# Patient Record
Sex: FEMALE | Race: WHITE | NOT HISPANIC OR LATINO | Employment: UNEMPLOYED | URBAN - METROPOLITAN AREA
[De-identification: names, ages, dates, MRNs, and addresses within clinical notes are randomized per-mention and may not be internally consistent; named-entity substitution may affect disease eponyms.]

---

## 2019-01-04 ENCOUNTER — TELEPHONE (OUTPATIENT)
Dept: GASTROENTEROLOGY | Facility: CLINIC | Age: 59
End: 2019-01-04

## 2019-01-11 RX ORDER — RABEPRAZOLE SODIUM 20 MG/1
20 TABLET, DELAYED RELEASE ORAL DAILY
Refills: 3 | COMMUNITY
Start: 2019-01-04 | End: 2019-01-14

## 2019-01-14 ENCOUNTER — OFFICE VISIT (OUTPATIENT)
Dept: GASTROENTEROLOGY | Facility: CLINIC | Age: 59
End: 2019-01-14
Payer: COMMERCIAL

## 2019-01-14 VITALS
WEIGHT: 192 LBS | DIASTOLIC BLOOD PRESSURE: 92 MMHG | HEART RATE: 78 BPM | SYSTOLIC BLOOD PRESSURE: 132 MMHG | BODY MASS INDEX: 30.99 KG/M2

## 2019-01-14 DIAGNOSIS — R10.9 STOMACH PAIN: Primary | ICD-10-CM

## 2019-01-14 PROCEDURE — 99214 OFFICE O/P EST MOD 30 MIN: CPT | Performed by: INTERNAL MEDICINE

## 2019-01-14 RX ORDER — CIPROFLOXACIN 500 MG/1
500 TABLET, FILM COATED ORAL EVERY 12 HOURS SCHEDULED
Qty: 14 TABLET | Refills: 0 | Status: SHIPPED | OUTPATIENT
Start: 2019-01-14 | End: 2019-01-21

## 2019-01-14 RX ORDER — METRONIDAZOLE 500 MG/1
500 TABLET ORAL EVERY 8 HOURS SCHEDULED
Qty: 21 TABLET | Refills: 0 | Status: SHIPPED | OUTPATIENT
Start: 2019-01-14 | End: 2019-01-21

## 2019-01-14 NOTE — PROGRESS NOTES
[facility] - Outpatient Consultation  Shimon Aburto 66 y o  female MRN: [id number]  Encounter: [id number]          ASSESSMENT AND PLAN:      1  Stomach pain  Left lower quadrant  - CBC and Platelet; Future  - CT abdomen pelvis wo contrast; Future  - metroNIDAZOLE (FLAGYL) 500 mg tablet; Take 1 tablet (500 mg total) by mouth every 8 (eight) hours for 7 days  Dispense: 21 tablet; Refill: 0  - ciprofloxacin (CIPRO) 500 mg tablet; Take 1 tablet (500 mg total) by mouth every 12 (twelve) hours for 7 days  Dispense: 14 tablet; Refill: 0      ______________________________________________________________________    HPI:  Patient complaining of left lower quadrant abdominal pain for the past week  She flew to [place] and began having both constipation and left lower quadrant abdominal pain, intermittent  She admits to both night sweats and chills, but denies fevers  She had an episode of documented diverticulitis in the spring of 2017  Colonoscopy performed 6 weeks later demonstrated  Sigmoid diverticulosis  She was treated with Flagyl and cipro then with resolution of the symptoms  She admits to a high fiber diet  REVIEW OF SYSTEMS:    CONSTITUTIONAL: Denies any fever, chills, rigors, and weight loss  HEENT: No earache or tinnitus  Denies hearing loss or visual disturbances  CARDIOVASCULAR: No chest pain or palpitations  RESPIRATORY: Denies any cough, hemoptysis, shortness of breath or dyspnea on exertion  GASTROINTESTINAL: As noted in the History of Present Illness  GENITOURINARY: No problems with urination  Denies any hematuria or dysuria  NEUROLOGIC: No dizziness or vertigo, denies headaches  MUSCULOSKELETAL: Denies any muscle or joint pain  SKIN: Denies skin rashes or itching  ENDOCRINE: Denies excessive thirst  Denies intolerance to heat or cold  PSYCHOSOCIAL: Denies depression or anxiety  Denies any recent memory loss         Historical Information Past Medical History:   Diagnosis Date    Arthritis     Asthma     Diverticulitis of colon     Diverticulosis     Hypertension      Past Surgical History:   Procedure Laterality Date    COLONOSCOPY  03/2017     Social History   History   Alcohol Use    Yes     Comment: occ     History   Drug Use No     History   Smoking Status    Never Smoker   Smokeless Tobacco    Never Used     Family History   Problem Relation Age of Onset    No Known Problems Mother     Arthritis Father        Meds/Allergies       Current Outpatient Prescriptions:     olmesartan (BENICAR) 40 mg tablet    ciprofloxacin (CIPRO) 500 mg tablet    metroNIDAZOLE (FLAGYL) 500 mg tablet    Allergies   Allergen Reactions    Morphine Shortness Of Breath     duramorph    Naratriptan Shortness Of Breath    Iv Dye [Iodinated Diagnostic Agents] Chest Pain    Antihistamines, Chlorpheniramine-Type     Cetirizine      Other reaction(s): breathing difficulty, anxiety, insomnia    Iodine Hypertension    Penicillins      Other reaction(s): GI upset    Latex Rash           Objective     Blood pressure 132/92, pulse 78, weight 87 1 kg (192 lb)  Body mass index is 30 99 kg/m²  PHYSICAL EXAM:      General Appearance:   Alert, cooperative, no distress   HEENT:   Normocephalic, atraumatic, anicteric      Neck:  Supple, symmetrical, trachea midline   Lungs:   Clear to auscultation bilaterally; no rales, rhonchi or wheezing; respirations unlabored    Heart[de-identified]   Regular rate and rhythm; no murmur, rub, or gallop  Abdomen:   Soft, tender in the left lower quadrant, non-distended; normal bowel sounds; no masses, no organomegaly    Genitalia:   Deferred    Rectal:   Deferred    Extremities:  No cyanosis, clubbing or edema    Pulses:  2+ and symmetric    Skin:  No jaundice, rashes, or lesions    Lymph nodes:  No palpable cervical lymphadenopathy        Lab Results:   No visits with results within 1 Day(s) from this visit     Latest known visit with results is:   No results found for any previous visit  Radiology Results:   No results found

## 2019-01-14 NOTE — PATIENT INSTRUCTIONS
High fiber diet, 25 grams of fiber minimum daily; fiber worksheet provided    Will order a CT scan of the abdomen/pelvis without contrast    CBC to rule out inflammation    Will prescribe Flagyl 500 mg tid and Cipro 500 mg bid for one week

## 2019-01-15 ENCOUNTER — APPOINTMENT (OUTPATIENT)
Dept: LAB | Facility: HOSPITAL | Age: 59
End: 2019-01-15
Attending: INTERNAL MEDICINE
Payer: COMMERCIAL

## 2019-01-15 DIAGNOSIS — R10.9 STOMACH PAIN: ICD-10-CM

## 2019-01-15 LAB
ERYTHROCYTE [DISTWIDTH] IN BLOOD BY AUTOMATED COUNT: 12.6 % (ref 11.6–15.1)
HCT VFR BLD AUTO: 42 % (ref 34.8–46.1)
HGB BLD-MCNC: 13.2 G/DL (ref 11.5–15.4)
MCH RBC QN AUTO: 27.5 PG (ref 26.8–34.3)
MCHC RBC AUTO-ENTMCNC: 31.4 G/DL (ref 31.4–37.4)
MCV RBC AUTO: 88 FL (ref 82–98)
PLATELET # BLD AUTO: 365 THOUSANDS/UL (ref 149–390)
PMV BLD AUTO: 9.5 FL (ref 8.9–12.7)
RBC # BLD AUTO: 4.8 MILLION/UL (ref 3.81–5.12)
WBC # BLD AUTO: 7.58 THOUSAND/UL (ref 4.31–10.16)

## 2019-01-15 PROCEDURE — 85027 COMPLETE CBC AUTOMATED: CPT

## 2019-01-15 PROCEDURE — 36415 COLL VENOUS BLD VENIPUNCTURE: CPT

## 2019-01-16 ENCOUNTER — TELEPHONE (OUTPATIENT)
Dept: GASTROENTEROLOGY | Facility: CLINIC | Age: 59
End: 2019-01-16

## 2019-01-23 ENCOUNTER — TELEPHONE (OUTPATIENT)
Dept: GASTROENTEROLOGY | Facility: CLINIC | Age: 59
End: 2019-01-23

## 2019-04-25 DIAGNOSIS — K21.9 GASTROESOPHAGEAL REFLUX DISEASE WITHOUT ESOPHAGITIS: Primary | ICD-10-CM

## 2019-04-25 RX ORDER — RABEPRAZOLE SODIUM 20 MG/1
20 TABLET, DELAYED RELEASE ORAL DAILY
Refills: 3 | COMMUNITY
Start: 2019-03-25 | End: 2019-04-25 | Stop reason: SDUPTHER

## 2019-04-25 RX ORDER — RABEPRAZOLE SODIUM 20 MG/1
20 TABLET, DELAYED RELEASE ORAL DAILY
Qty: 30 TABLET | Refills: 3 | Status: SHIPPED | OUTPATIENT
Start: 2019-04-25 | End: 2019-08-27 | Stop reason: SDUPTHER

## 2019-08-27 DIAGNOSIS — K21.9 GASTROESOPHAGEAL REFLUX DISEASE WITHOUT ESOPHAGITIS: ICD-10-CM

## 2019-08-27 RX ORDER — RABEPRAZOLE SODIUM 20 MG/1
20 TABLET, DELAYED RELEASE ORAL DAILY
Qty: 90 TABLET | Refills: 3 | Status: SHIPPED | OUTPATIENT
Start: 2019-08-27 | End: 2021-01-19

## 2020-01-29 ENCOUNTER — OFFICE VISIT (OUTPATIENT)
Dept: GASTROENTEROLOGY | Facility: CLINIC | Age: 60
End: 2020-01-29
Payer: COMMERCIAL

## 2020-01-29 VITALS
BODY MASS INDEX: 29.73 KG/M2 | HEIGHT: 67 IN | WEIGHT: 189.4 LBS | SYSTOLIC BLOOD PRESSURE: 92 MMHG | DIASTOLIC BLOOD PRESSURE: 64 MMHG | HEART RATE: 76 BPM

## 2020-01-29 DIAGNOSIS — K57.90 DIVERTICULAR DISEASE: Primary | ICD-10-CM

## 2020-01-29 DIAGNOSIS — K21.9 GASTROESOPHAGEAL REFLUX DISEASE WITHOUT ESOPHAGITIS: ICD-10-CM

## 2020-01-29 PROCEDURE — 99213 OFFICE O/P EST LOW 20 MIN: CPT | Performed by: INTERNAL MEDICINE

## 2020-01-29 RX ORDER — RABEPRAZOLE SODIUM 20 MG/1
20 TABLET, DELAYED RELEASE ORAL DAILY
Qty: 31 TABLET | Refills: 11 | Status: SHIPPED | OUTPATIENT
Start: 2020-01-29 | End: 2021-04-14

## 2020-01-29 NOTE — PROGRESS NOTES
[facility] - Outpatient Follow-up Note  Graciela Kitchen 64 y o  female MRN: [id number]  Encounter: [id number]          ASSESSMENT AND PLAN:      1  Diverticular disease    2  Gastroesophageal reflux disease without esophagitis    - RABEprazole (ACIPHEX) 20 MG tablet; Take 1 tablet (20 mg total) by mouth daily  Dispense: 31 tablet; Refill: 11    No additional diagnostic tests or required at this time  The patient is up-to-date both on colonoscopy as well as esophagogastroduodenoscopy  ______________________________________________________________________    SUBJECTIVE:  This 61-year-old female comes to the office today for routine follow-up  She was last seen 1 year ago  She has a known history of gastroesophageal reflux disease for which she takes AcipHex  She states that as long she is on the AcipHex then her symptoms of heartburn are well controlled  She denies any dysphagia, heartburn, abdominal pain, nausea, vomiting, diarrhea, constipation, rectal bleeding, melena, hematemesis  She states that she is feeling quite well at this time  REVIEW OF SYSTEMS IS OTHERWISE NEGATIVE        Historical Information   Past Medical History:   Diagnosis Date    Arthritis     Asthma     Diverticulitis of colon     Diverticulosis     Hypertension      Past Surgical History:   Procedure Laterality Date    COLONOSCOPY  03/2017     Social History   Social History     Substance and Sexual Activity   Alcohol Use Yes    Comment: occ     Social History     Substance and Sexual Activity   Drug Use No     Social History     Tobacco Use   Smoking Status Never Smoker   Smokeless Tobacco Never Used     Family History   Problem Relation Age of Onset    No Known Problems Mother     Arthritis Father        Meds/Allergies       Current Outpatient Medications:     azilsartan medoxomil (EDARBI) 80 MG tablet    RABEprazole (ACIPHEX) 20 MG tablet    olmesartan (BENICAR) 40 mg tablet   RABEprazole (ACIPHEX) 20 MG tablet    Allergies   Allergen Reactions    Morphine Shortness Of Breath     duramorph    Naratriptan Shortness Of Breath    Iv Dye [Iodinated Diagnostic Agents] Chest Pain    Antihistamines, Chlorpheniramine-Type     Cetirizine      Other reaction(s): breathing difficulty, anxiety, insomnia    Iodine Hypertension    Penicillins      Other reaction(s): GI upset    Latex Rash           Objective     Blood pressure 92/64, pulse 76, height 5' 7" (1 702 m), weight 85 9 kg (189 lb 6 4 oz)  Body mass index is 29 66 kg/m²  PHYSICAL EXAM:      General Appearance:   Alert, cooperative, no distress   HEENT:   Normocephalic, atraumatic, anicteric      Neck:  Supple, symmetrical, trachea midline   Lungs:   Clear to auscultation bilaterally; no rales, rhonchi or wheezing; respirations unlabored    Heart[de-identified]   Regular rate and rhythm; no murmur, rub, or gallop  Abdomen:   Soft, non-tender, non-distended; normal bowel sounds; no masses, no organomegaly    Genitalia:   Deferred    Rectal:   Deferred    Extremities:  No cyanosis, clubbing or edema    Pulses:  2+ and symmetric    Skin:  No jaundice, rashes, or lesions    Lymph nodes:  No palpable cervical lymphadenopathy        Lab Results:   No visits with results within 1 Day(s) from this visit  Latest known visit with results is:   Appointment on 01/15/2019   Component Date Value    WBC 01/15/2019 7 58     RBC 01/15/2019 4 80     Hemoglobin 01/15/2019 13 2     Hematocrit 01/15/2019 42 0     MCV 01/15/2019 88     [address] 01/15/2019 27 5     MCHC 01/15/2019 31 4     RDW 01/15/2019 12 6     Platelets 81/31/1178 365     MPV 01/15/2019 9 5          Radiology Results:   No results found

## 2020-02-06 ENCOUNTER — TELEPHONE (OUTPATIENT)
Dept: GASTROENTEROLOGY | Facility: CLINIC | Age: 60
End: 2020-02-06

## 2020-02-06 NOTE — TELEPHONE ENCOUNTER
Dr Kelsi Lipscomb - Patient called lmom with medication questions  I did not understand the medication   Called patient lmom to give office a call back at [phone] ty

## 2020-02-06 NOTE — TELEPHONE ENCOUNTER
Called pt and pt said just needed meds to go to [facility] in [place]    Pt already called and had rx transferred  Changed in chart

## 2021-01-19 ENCOUNTER — OFFICE VISIT (OUTPATIENT)
Dept: GASTROENTEROLOGY | Facility: CLINIC | Age: 61
End: 2021-01-19
Payer: COMMERCIAL

## 2021-01-19 VITALS
BODY MASS INDEX: 29.13 KG/M2 | DIASTOLIC BLOOD PRESSURE: 72 MMHG | HEART RATE: 64 BPM | WEIGHT: 185.6 LBS | SYSTOLIC BLOOD PRESSURE: 98 MMHG | HEIGHT: 67 IN

## 2021-01-19 DIAGNOSIS — K58.1 IRRITABLE BOWEL SYNDROME WITH CONSTIPATION: Primary | ICD-10-CM

## 2021-01-19 PROCEDURE — 99213 OFFICE O/P EST LOW 20 MIN: CPT | Performed by: PHYSICIAN ASSISTANT

## 2021-01-19 NOTE — PROGRESS NOTES
[facility] - Outpatient Follow-up Note  Shobha Patient 64 y o  female MRN: [id number]  Encounter: [id number]          ASSESSMENT AND PLAN:      1  Irritable bowel syndrome with constipation  Patient will start fiber supplementation daily  Patient will start daily exercise and continue drinking plenty of water  Patient has no alarm symptoms at this time will hold off on any endoscopic evaluation  ______________________________________________________________________    SUBJECTIVE:    56-year-old female very well known to us who presents for follow-up  Patient reports over the last month she has been suffering with symptoms likely related to irritable bowel syndrome  Patient reports she has been slightly on the constipated side and she is going to start taking Citrucel  She was reporting lower abdominal cramping  She denies any significant nausea or vomiting  She does take AcipHex 20 mg daily for reflux disease and she is going to continue this  Patient has tried to be off of this medication in the past without significant benefit  Patient denies any alarm symptoms at this time  REVIEW OF SYSTEMS IS OTHERWISE NEGATIVE        Historical Information   Past Medical History:   Diagnosis Date    Arthritis     Asthma     Diverticulitis of colon     Diverticulosis     Hypertension      Past Surgical History:   Procedure Laterality Date    COLONOSCOPY  03/2017     Social History   Social History     Substance and Sexual Activity   Alcohol Use Yes    Comment: occ     Social History     Substance and Sexual Activity   Drug Use No     Social History     Tobacco Use   Smoking Status Never Smoker   Smokeless Tobacco Never Used     Family History   Problem Relation Age of Onset    No Known Problems Mother     Arthritis Father        Meds/Allergies       Current Outpatient Medications:     azilsartan medoxomil (EDARBI) 80 MG tablet    olmesartan (BENICAR) 40 mg tablet   RABEprazole (ACIPHEX) 20 MG tablet    Allergies   Allergen Reactions    Morphine Shortness Of Breath     duramorph    Naratriptan Shortness Of Breath    Iv Dye [Iodinated Diagnostic Agents] Chest Pain    Antihistamines, Chlorpheniramine-Type Other (See Comments)    Cetirizine      Other reaction(s): breathing difficulty, anxiety, insomnia    Iodine Hypertension    Penicillins      Other reaction(s): GI upset    Latex Rash    Other Rash           Objective     Blood pressure 98/72, pulse 64, height 5' 7" (1 702 m), weight 84 2 kg (185 lb 9 6 oz)  Body mass index is 29 07 kg/m²  PHYSICAL EXAM:      General Appearance:   Alert, cooperative, no distress   HEENT:   Normocephalic, atraumatic, anicteric      Neck:  Supple, symmetrical, trachea midline   Lungs:   Clear to auscultation bilaterally; no rales, rhonchi or wheezing; respirations unlabored    Heart[de-identified]   Regular rate and rhythm; no murmur, rub, or gallop  Abdomen:   Soft, non-tender, non-distended; normal bowel sounds; no masses, no organomegaly    Genitalia:   Deferred    Rectal:   Deferred    Extremities:  No cyanosis, clubbing or edema    Pulses:  2+ and symmetric    Skin:  No jaundice, rashes, or lesions    Lymph nodes:  No palpable cervical lymphadenopathy        Lab Results:   No visits with results within 1 Day(s) from this visit  Latest known visit with results is:   Appointment on 01/15/2019   Component Date Value    WBC 01/15/2019 7 58     RBC 01/15/2019 4 80     Hemoglobin 01/15/2019 13 2     Hematocrit 01/15/2019 42 0     MCV 01/15/2019 88     [address] 01/15/2019 27 5     MCHC 01/15/2019 31 4     RDW 01/15/2019 12 6     Platelets 86/02/0416 365     MPV 01/15/2019 9 5          Radiology Results:   No results found

## 2021-01-19 NOTE — PATIENT INSTRUCTIONS
Irritable Bowel Syndrome   WHAT YOU NEED TO KNOW:   IBS is a condition that prevents food from moving through your intestines normally  The food may move through too slowly or too quickly  This causes abdominal pain, bloating, increased gas, constipation, or diarrhea  DISCHARGE INSTRUCTIONS:   Return to the emergency department if:   · You have severe abdominal pain  · Your bowel movements are dark or have blood in them  Call your doctor if:   · You have a fever  · You have pain in your rectum  · You are losing weight without trying  · Your abdominal pain does not go away, even after treatment  · You have questions or concerns about your condition or care  Medicines:   · Medicines  help you have a bowel movement, soften your bowel movement, or treat diarrhea  You may also need medicine to relax your muscles  This will decrease abdominal pain and muscles spasms  · Take your medicine as directed  Contact your healthcare provider if you think your medicine is not helping or if you have side effects  Tell him of her if you are allergic to any medicine  Keep a list of the medicines, vitamins, and herbs you take  Include the amounts, and when and why you take them  Bring the list or the pill bottles to follow-up visits  Carry your medicine list with you in case of an emergency  Manage IBS:   · Eat a variety of healthy foods  Healthy foods include fruits, vegetables, whole-grain breads, low-fat dairy products, beans, lean meats, and fish  You may need to avoid certain foods to decrease your symptoms  · Drink liquids as directed  Ask how much liquid to drink each day and which liquids are best for you  For most people, good liquids to drink are water, juice, and milk  · Exercise regularly  Ask about the best exercise plan for you  Exercise can decrease your blood pressure and improve your health  · Keep a record  for 3 weeks  Include everything you eat and drink and your symptoms  Bring this record with you to your follow-up visits  Follow up with your doctor as directed:  Write down your questions so you remember to ask them during your visits  © Copyright [address] Information is for End User's use only and may not be sold, redistributed or otherwise used for commercial purposes  All illustrations and images included in CareNotes® are the copyrighted property of A D A M , Inc  or [facility] Mika Calabrese   The above information is an  only  It is not intended as medical advice for individual conditions or treatments  Talk to your doctor, nurse or pharmacist before following any medical regimen to see if it is safe and effective for you

## 2021-01-19 NOTE — LETTER
January 19, 2021     Kandi Pollock DO  [address] [facility]    Patient: Manuel Carmona   YOB: 1960   Date of Visit: 1/19/2021       Dear Dr Pak Se: Thank you for referring [place] to me for evaluation  Below are my notes for this consultation  If you have questions, please do not hesitate to call me  I look forward to following your patient along with you  Sincerely,        Sam Meza PA-C        CC: No Recipients  Shavon Chavez  1/19/2021  2:54 PM  Sign when Signing Visit  [facility] - Outpatient Follow-up Note  Manuel Carmona 64 y o  female MRN: [id number]  Encounter: [id number]          ASSESSMENT AND PLAN:      1  Irritable bowel syndrome with constipation  Patient will start fiber supplementation daily  Patient will start daily exercise and continue drinking plenty of water  Patient has no alarm symptoms at this time will hold off on any endoscopic evaluation  ______________________________________________________________________    SUBJECTIVE:    61-year-old female very well known to us who presents for follow-up  Patient reports over the last month she has been suffering with symptoms likely related to irritable bowel syndrome  Patient reports she has been slightly on the constipated side and she is going to start taking Citrucel  She was reporting lower abdominal cramping  She denies any significant nausea or vomiting  She does take AcipHex 20 mg daily for reflux disease and she is going to continue this  Patient has tried to be off of this medication in the past without significant benefit  Patient denies any alarm symptoms at this time  REVIEW OF SYSTEMS IS OTHERWISE NEGATIVE        Historical Information   Past Medical History:   Diagnosis Date    Arthritis     Asthma     Diverticulitis of colon     Diverticulosis     Hypertension      Past Surgical History:   Procedure Laterality Date    COLONOSCOPY  03/2017     Social History   Social History     Substance and Sexual Activity   Alcohol Use Yes    Comment: occ     Social History     Substance and Sexual Activity   Drug Use No     Social History     Tobacco Use   Smoking Status Never Smoker   Smokeless Tobacco Never Used     Family History   Problem Relation Age of Onset    No Known Problems Mother     Arthritis Father        Meds/Allergies       Current Outpatient Medications:     azilsartan medoxomil (EDARBI) 80 MG tablet    olmesartan (BENICAR) 40 mg tablet    RABEprazole (ACIPHEX) 20 MG tablet    Allergies   Allergen Reactions    Morphine Shortness Of Breath     duramorph    Naratriptan Shortness Of Breath    Iv Dye [Iodinated Diagnostic Agents] Chest Pain    Antihistamines, Chlorpheniramine-Type Other (See Comments)    Cetirizine      Other reaction(s): breathing difficulty, anxiety, insomnia    Iodine Hypertension    Penicillins      Other reaction(s): GI upset    Latex Rash    Other Rash           Objective     Blood pressure 98/72, pulse 64, height 5' 7" (1 702 m), weight 84 2 kg (185 lb 9 6 oz)  Body mass index is 29 07 kg/m²  PHYSICAL EXAM:      General Appearance:   Alert, cooperative, no distress   HEENT:   Normocephalic, atraumatic, anicteric      Neck:  Supple, symmetrical, trachea midline   Lungs:   Clear to auscultation bilaterally; no rales, rhonchi or wheezing; respirations unlabored    Heart[de-identified]   Regular rate and rhythm; no murmur, rub, or gallop  Abdomen:   Soft, non-tender, non-distended; normal bowel sounds; no masses, no organomegaly    Genitalia:   Deferred    Rectal:   Deferred    Extremities:  No cyanosis, clubbing or edema    Pulses:  2+ and symmetric    Skin:  No jaundice, rashes, or lesions    Lymph nodes:  No palpable cervical lymphadenopathy        Lab Results:   No visits with results within 1 Day(s) from this visit     Latest known visit with results is:   Appointment on 01/15/2019   Component Date Value    WBC 01/15/2019 7 58     RBC 01/15/2019 4 80     Hemoglobin 01/15/2019 13 2     Hematocrit 01/15/2019 42 0     MCV 01/15/2019 88     [address] 01/15/2019 27 5     MCHC 01/15/2019 31 4     RDW 01/15/2019 12 6     Platelets 65/62/4610 365     MPV 01/15/2019 9 5          Radiology Results:   No results found

## 2021-04-01 ENCOUNTER — TELEPHONE (OUTPATIENT)
Dept: NEPHROLOGY | Facility: CLINIC | Age: 61
End: 2021-04-01

## 2021-04-14 DIAGNOSIS — K21.9 GASTROESOPHAGEAL REFLUX DISEASE WITHOUT ESOPHAGITIS: ICD-10-CM

## 2021-04-14 RX ORDER — RABEPRAZOLE SODIUM 20 MG/1
TABLET, DELAYED RELEASE ORAL
Qty: 31 TABLET | Refills: 11 | Status: SHIPPED | OUTPATIENT
Start: 2021-04-14 | End: 2022-01-13

## 2022-01-13 DIAGNOSIS — K21.9 GASTROESOPHAGEAL REFLUX DISEASE WITHOUT ESOPHAGITIS: ICD-10-CM

## 2022-01-13 RX ORDER — RABEPRAZOLE SODIUM 20 MG/1
TABLET, DELAYED RELEASE ORAL
Qty: 31 TABLET | Refills: 11 | Status: SHIPPED | OUTPATIENT
Start: 2022-01-13

## 2022-03-08 ENCOUNTER — NURSE TRIAGE (OUTPATIENT)
Dept: OTHER | Facility: OTHER | Age: 62
End: 2022-03-08

## 2022-03-08 NOTE — TELEPHONE ENCOUNTER
Regarding: abdominal pain  ----- Message from [facility] sent at 3/8/2022  7:37 AM EST -----  "I am experiencing abdominal pain   I have gas bloating and lower back pain "

## 2022-03-08 NOTE — TELEPHONE ENCOUNTER
T[facility] connect message sent to the on call provider regarding patient's symptoms  The on call provider is going to have the PA contact the patient  The patient was made aware that someone from the office will be contacting her  Reason for Disposition   [1] MILD-MODERATE pain AND [2] constant AND [3] present > 2 hours   [1] SEVERE back pain (e g , excruciating, unable to do any normal activities) AND [2] not improved 2 hours after pain medicine    Answer Assessment - Initial Assessment Questions  1  LOCATION: "Where does it hurt?"       "It is in the center and off to the left "   2  RADIATION: "Does the pain shoot anywhere else?" (e g , chest, back)      Occasionally to back   3  ONSET: "When did the pain begin?" (e g , minutes, hours or days ago)       Sunday   4  SUDDEN: "Gradual or sudden onset?"      Gradual   5  PATTERN "Does the pain come and go, or is it constant?"     - If constant: "Is it getting better, staying the same, or worsening?"       (Note: Constant means the pain never goes away completely; most serious pain is constant and it progresses)      - If intermittent: "How long does it last?" "Do you have pain now?"      (Note: Intermittent means the pain goes away completely between bouts)      Constant   6  SEVERITY: "How bad is the pain?"  (e g , Scale 1-10; mild, moderate, or severe)    - MILD (1-3): doesn't interfere with normal activities, abdomen soft and not tender to touch     - MODERATE (4-7): interferes with normal activities or awakens from sleep, tender to touch     - SEVERE (8-10): excruciating pain, doubled over, unable to do any normal activities       7/10 "I get a hahn shooting pain that wraps around the left side "   7   RECURRENT SYMPTOM: "Have you ever had this type of stomach pain before?" If Yes, ask: "When was the last time?" and "What happened that time?"       "Yes, in the past when I had a flare up of diverticulitis "   8  CAUSE: "What do you think is causing the stomach pain?"      Unknown   9  RELIEVING/AGGRAVATING FACTORS: "What makes it better or worse?" (e g , movement, antacids, bowel movement)      Better - nothing      Worse - full bladder   10  OTHER SYMPTOMS: "Has there been any vomiting, diarrhea, constipation, or urine problems?"        Nausea, constipation, back pain, gas, bloating    Answer Assessment - Initial Assessment Questions  1  ONSET: "When did the pain begin?"       Monday   2  LOCATION: "Where does it hurt?" (upper, mid or lower back)      Lower   3  SEVERITY: "How bad is the pain?"  (e g , Scale 1-10; mild, moderate, or severe)    - MILD (1-3): doesn't interfere with normal activities     - MODERATE (4-7): interferes with normal activities or awakens from sleep     - SEVERE (8-10): excruciating pain, unable to do any normal activities       8/10   4  PATTERN: "Is the pain constant?" (e g , yes, no; constant, intermittent)       Constant   5  RADIATION: "Does the pain shoot into your legs or elsewhere?"      "Down my left hip "   6  CAUSE:  "What do you think is causing the back pain?"       Unknown, "The last time I had a flare up of diverticulitis I had lower back pain "   7  BACK OVERUSE:  "Any recent lifting of heavy objects, strenuous work or exercise?"      Denies   8  MEDICATIONS: "What have you taken so far for the pain?" (e g , nothing, acetaminophen, NSAIDS)      Tylenol "It takes the edge off  It allowed me to stay asleep last night "   9  NEUROLOGIC SYMPTOMS: "Do you have any weakness, numbness, or problems with bowel/bladder control?"      Denies   10   OTHER SYMPTOMS: "Do you have any other symptoms?" (e g , fever, abdominal pain, burning with urination, blood in urine)        Abdominal pain, 99 6 oral this morning    Protocols used: ABDOMINAL PAIN - FEMALE-ADULT-, BACK PAIN-ADULT-AH

## 2022-03-08 NOTE — TELEPHONE ENCOUNTER
With patient  She is reporting symptoms consistent with acute diverticulitis  Scripts for Cipro and Flagyl sent to the pharmacy

## 2022-11-03 DIAGNOSIS — K21.9 GASTROESOPHAGEAL REFLUX DISEASE WITHOUT ESOPHAGITIS: ICD-10-CM

## 2022-11-03 RX ORDER — RABEPRAZOLE SODIUM 20 MG/1
TABLET, DELAYED RELEASE ORAL
Qty: 31 TABLET | Refills: 11 | Status: SHIPPED | OUTPATIENT
Start: 2022-11-03

## 2023-09-19 DIAGNOSIS — K21.9 GASTROESOPHAGEAL REFLUX DISEASE WITHOUT ESOPHAGITIS: ICD-10-CM

## 2023-09-19 RX ORDER — RABEPRAZOLE SODIUM 20 MG/1
20 TABLET, DELAYED RELEASE ORAL DAILY
Qty: 31 TABLET | Refills: 11 | Status: SHIPPED | OUTPATIENT
Start: 2023-09-19 | End: 2023-10-30

## 2023-10-30 DIAGNOSIS — K21.9 GASTROESOPHAGEAL REFLUX DISEASE WITHOUT ESOPHAGITIS: ICD-10-CM

## 2023-10-30 RX ORDER — RABEPRAZOLE SODIUM 20 MG/1
20 TABLET, DELAYED RELEASE ORAL DAILY
Qty: 31 TABLET | Refills: 0 | Status: SHIPPED | OUTPATIENT
Start: 2023-10-30

## 2023-10-31 NOTE — TELEPHONE ENCOUNTER
LMOM for patient to phone back and schedule a fup appt with either Dr. Marzena Martinez or a PA for medication review

## 2023-11-21 DIAGNOSIS — K21.9 GASTROESOPHAGEAL REFLUX DISEASE WITHOUT ESOPHAGITIS: ICD-10-CM

## 2023-11-21 RX ORDER — RABEPRAZOLE SODIUM 20 MG/1
20 TABLET, DELAYED RELEASE ORAL DAILY
Qty: 30 TABLET | Refills: 0 | Status: SHIPPED | OUTPATIENT
Start: 2023-11-21

## 2023-12-08 RX ORDER — CYCLOSPORINE 0.5 MG/ML
EMULSION OPHTHALMIC
COMMUNITY
Start: 2023-10-18

## 2023-12-11 ENCOUNTER — OFFICE VISIT (OUTPATIENT)
Dept: GASTROENTEROLOGY | Facility: CLINIC | Age: 63
End: 2023-12-11
Payer: COMMERCIAL

## 2023-12-11 VITALS
HEIGHT: 66 IN | SYSTOLIC BLOOD PRESSURE: 105 MMHG | WEIGHT: 168.4 LBS | HEART RATE: 63 BPM | OXYGEN SATURATION: 98 % | BODY MASS INDEX: 27.06 KG/M2 | DIASTOLIC BLOOD PRESSURE: 71 MMHG

## 2023-12-11 DIAGNOSIS — Z86.010 HISTORY OF COLON POLYPS: ICD-10-CM

## 2023-12-11 DIAGNOSIS — K21.9 GASTROESOPHAGEAL REFLUX DISEASE WITHOUT ESOPHAGITIS: ICD-10-CM

## 2023-12-11 DIAGNOSIS — K57.90 DIVERTICULOSIS: Primary | ICD-10-CM

## 2023-12-11 PROCEDURE — 99213 OFFICE O/P EST LOW 20 MIN: CPT | Performed by: PHYSICIAN ASSISTANT

## 2023-12-11 RX ORDER — RABEPRAZOLE SODIUM 20 MG/1
20 TABLET, DELAYED RELEASE ORAL DAILY
Qty: 30 TABLET | Refills: 3 | Status: SHIPPED | OUTPATIENT
Start: 2023-12-11 | End: 2024-03-10

## 2023-12-11 NOTE — PATIENT INSTRUCTIONS
Scheduled date of colonoscopy (as of today):3/14/24  Physician performing colonoscopy:Michael  Location of colonoscopy:[place]  Bowel prep reviewed with patient:Shyanne/Miralax  Instructions reviewed with patient by:Hitesh arana  Clearances:  none

## 2023-12-11 NOTE — PROGRESS NOTES
[facility] - Outpatient Follow-up Note  Jimmy Rothman 66 y.o. female MRN: [id number]  Encounter: [id number]          ASSESSMENT AND PLAN:      1. Diverticulosis  2. History of colon polyps  3. Gastroesophageal reflux disease without esophagitis  -Patient will be scheduled for colonoscopy.    -Aciphex 20 mg daily refilled to her pharmacy.    -Continue GERD dietary and lifestyle modifications. ______________________________________________________________________    SUBJECTIVE:    79-year-old female with a past medical history significant for diverticular disease, GERD, hypertension, arthritis who presents to the GI clinic today for follow-up. Patient reports that she is here for a medication refill of her Aciphex 20 mg daily which she takes for acid reflux and controls her symptoms quite well. Patient is also due for routine colonoscopy. Patient's last colonoscopy was performed 5 years ago. Patient does have a history of colon polyps in the past.  Patient reports that overall she is feeling well. She denies any diarrhea or constipation. She denies any melena or rectal bleeding. She denies any dysphagia or unintentional weight loss. REVIEW OF SYSTEMS IS OTHERWISE NEGATIVE.       Historical Information   Past Medical History:   Diagnosis Date    Arthritis     Asthma     Diverticulitis of colon     Diverticulosis     Hypertension      Past Surgical History:   Procedure Laterality Date    COLONOSCOPY  03/2017     Social History   Social History     Substance and Sexual Activity   Alcohol Use Yes    Comment: occ     Social History     Substance and Sexual Activity   Drug Use No     Social History     Tobacco Use   Smoking Status Never   Smokeless Tobacco Never     Family History   Problem Relation Age of Onset    No Known Problems Mother     Arthritis Father        Meds/Allergies       Current Outpatient Medications:     azilsartan medoxomil (EDARBI) 80 MG tablet betamethasone valerate (VALISONE) 0.1 % cream    ciclopirox (LOPROX) 0.77 % cream    cycloSPORINE (RESTASIS) 0.05 % ophthalmic emulsion    RABEprazole (ACIPHEX) 20 MG tablet    Allergies   Allergen Reactions    Morphine Shortness Of Breath     duramorph    Naratriptan Shortness Of Breath    Iv Dye [Iodinated Contrast Media] Chest Pain    Antihistamines, Chlorpheniramine-Type Other (See Comments)    Cetirizine      Other reaction(s): breathing difficulty, anxiety, insomnia    Iodine - Food Allergy Hypertension    Penicillins      Other reaction(s): GI upset    Latex Rash    Other Rash           Objective     Blood pressure 105/71, pulse 63, height 5' 6" (1.676 m), weight 76.4 kg (168 lb 6.4 oz), SpO2 98 %. Body mass index is 27.18 kg/m². PHYSICAL EXAM:      General Appearance:   Alert, cooperative, no distress   HEENT:   Normocephalic, atraumatic, anicteric. Neck:  Supple, symmetrical, trachea midline   Lungs:   Clear to auscultation bilaterally; no rales, rhonchi or wheezing; respirations unlabored    Heart[de-identified]   Regular rate and rhythm; no murmur, rub, or gallop. Abdomen:   Soft, non-tender, non-distended; normal bowel sounds; no masses, no organomegaly    Genitalia:   Deferred    Rectal:   Deferred    Extremities:  No cyanosis, clubbing or edema    Pulses:  2+ and symmetric    Skin:  No jaundice, rashes, or lesions    Lymph nodes:  No palpable cervical lymphadenopathy        Lab Results:   No visits with results within 1 Day(s) from this visit. Latest known visit with results is:   Appointment on 01/15/2019   Component Date Value    WBC 01/15/2019 7.58     RBC 01/15/2019 4.80     Hemoglobin 01/15/2019 13.2     Hematocrit 01/15/2019 42.0     MCV 01/15/2019 88     MCH 01/15/2019 27.5     MCHC 01/15/2019 31.4     RDW 01/15/2019 12.6     Platelets 10/04/0729 365     MPV 01/15/2019 9.5          Radiology Results:   No results found.

## 2024-02-20 ENCOUNTER — PREP FOR PROCEDURE (OUTPATIENT)
Dept: GASTROENTEROLOGY | Facility: CLINIC | Age: 64
End: 2024-02-20

## 2024-02-20 DIAGNOSIS — K21.9 GASTROESOPHAGEAL REFLUX DISEASE WITHOUT ESOPHAGITIS: ICD-10-CM

## 2024-02-20 DIAGNOSIS — R12 CHRONIC HEARTBURN: Primary | ICD-10-CM

## 2024-02-28 ENCOUNTER — TELEPHONE (OUTPATIENT)
Age: 64
End: 2024-02-28

## 2024-03-21 ENCOUNTER — TELEPHONE (OUTPATIENT)
Age: 64
End: 2024-03-21

## 2024-03-21 NOTE — TELEPHONE ENCOUNTER
Patient's Insurance Co ([employer])  Called to ask for codes.  Noticed Insurance listed in referrals is [employer].  FYI

## 2024-03-26 ENCOUNTER — TELEPHONE (OUTPATIENT)
Dept: GASTROENTEROLOGY | Facility: HOSPITAL | Age: 64
End: 2024-03-26

## 2024-03-27 ENCOUNTER — HOSPITAL ENCOUNTER (OUTPATIENT)
Dept: GASTROENTEROLOGY | Facility: HOSPITAL | Age: 64
Setting detail: OUTPATIENT SURGERY
Discharge: HOME | End: 2024-03-27
Admitting: INTERNAL MEDICINE
Payer: COMMERCIAL

## 2024-03-27 ENCOUNTER — ANESTHESIA (OUTPATIENT)
Dept: GASTROENTEROLOGY | Facility: HOSPITAL | Age: 64
End: 2024-03-27

## 2024-03-27 ENCOUNTER — ANESTHESIA EVENT (OUTPATIENT)
Dept: GASTROENTEROLOGY | Facility: HOSPITAL | Age: 64
End: 2024-03-27

## 2024-03-27 VITALS
BODY MASS INDEX: 27.28 KG/M2 | HEART RATE: 61 BPM | DIASTOLIC BLOOD PRESSURE: 55 MMHG | HEIGHT: 66 IN | RESPIRATION RATE: 16 BRPM | TEMPERATURE: 97.9 F | OXYGEN SATURATION: 100 % | SYSTOLIC BLOOD PRESSURE: 116 MMHG | WEIGHT: 169.75 LBS

## 2024-03-27 DIAGNOSIS — K21.9 GASTROESOPHAGEAL REFLUX DISEASE WITHOUT ESOPHAGITIS: ICD-10-CM

## 2024-03-27 DIAGNOSIS — K57.90 DIVERTICULOSIS: ICD-10-CM

## 2024-03-27 DIAGNOSIS — Z86.010 HISTORY OF COLON POLYPS: ICD-10-CM

## 2024-03-27 DIAGNOSIS — R12 CHRONIC HEARTBURN: ICD-10-CM

## 2024-03-27 PROBLEM — M79.7 FIBROMYALGIA: Status: ACTIVE | Noted: 2024-03-27

## 2024-03-27 PROBLEM — M19.90 ARTHRITIS: Status: ACTIVE | Noted: 2024-03-27

## 2024-03-27 PROBLEM — I10 HYPERTENSION: Status: ACTIVE | Noted: 2024-03-27

## 2024-03-27 PROBLEM — J45.909 ASTHMA: Status: ACTIVE | Noted: 2024-03-27

## 2024-03-27 PROBLEM — K57.32 DIVERTICULITIS OF COLON: Status: ACTIVE | Noted: 2024-03-27

## 2024-03-27 PROCEDURE — G0121 COLON CA SCRN NOT HI RSK IND: HCPCS | Performed by: INTERNAL MEDICINE

## 2024-03-27 PROCEDURE — 43239 EGD BIOPSY SINGLE/MULTIPLE: CPT | Performed by: INTERNAL MEDICINE

## 2024-03-27 PROCEDURE — 88305 TISSUE EXAM BY PATHOLOGIST: CPT | Performed by: PATHOLOGY

## 2024-03-27 RX ORDER — PROPOFOL 10 MG/ML
INJECTION, EMULSION INTRAVENOUS AS NEEDED
Status: DISCONTINUED | OUTPATIENT
Start: 2024-03-27 | End: 2024-03-27

## 2024-03-27 RX ORDER — OLMESARTAN MEDOXOMIL 40 MG/1
20 TABLET ORAL DAILY
COMMUNITY
Start: 2024-02-14

## 2024-03-27 RX ORDER — SODIUM CHLORIDE, SODIUM LACTATE, POTASSIUM CHLORIDE, CALCIUM CHLORIDE 600; 310; 30; 20 MG/100ML; MG/100ML; MG/100ML; MG/100ML
INJECTION, SOLUTION INTRAVENOUS CONTINUOUS PRN
Status: DISCONTINUED | OUTPATIENT
Start: 2024-03-27 | End: 2024-03-27

## 2024-03-27 RX ORDER — LIDOCAINE HYDROCHLORIDE 20 MG/ML
INJECTION, SOLUTION EPIDURAL; INFILTRATION; INTRACAUDAL; PERINEURAL AS NEEDED
Status: DISCONTINUED | OUTPATIENT
Start: 2024-03-27 | End: 2024-03-27

## 2024-03-27 RX ADMIN — PROPOFOL 40 MG: 10 INJECTION, EMULSION INTRAVENOUS at 13:34

## 2024-03-27 RX ADMIN — PROPOFOL 30 MG: 10 INJECTION, EMULSION INTRAVENOUS at 13:41

## 2024-03-27 RX ADMIN — PROPOFOL 40 MG: 10 INJECTION, EMULSION INTRAVENOUS at 13:37

## 2024-03-27 RX ADMIN — LIDOCAINE HYDROCHLORIDE 100 MG: 20 INJECTION, SOLUTION EPIDURAL; INFILTRATION; INTRACAUDAL; PERINEURAL at 13:33

## 2024-03-27 RX ADMIN — SODIUM CHLORIDE, SODIUM LACTATE, POTASSIUM CHLORIDE, AND CALCIUM CHLORIDE: .6; .31; .03; .02 INJECTION, SOLUTION INTRAVENOUS at 13:33

## 2024-03-27 RX ADMIN — PROPOFOL 40 MG: 10 INJECTION, EMULSION INTRAVENOUS at 13:39

## 2024-03-27 RX ADMIN — PROPOFOL 20 MG: 10 INJECTION, EMULSION INTRAVENOUS at 13:46

## 2024-03-27 RX ADMIN — PROPOFOL 80 MG: 10 INJECTION, EMULSION INTRAVENOUS at 13:33

## 2024-03-27 NOTE — H&P
"History and Physical -  [facility]  Kristina Napoles 63 y.o. female MRN: [id number]      HPI: Kristina Napoles is a 63 y.o. year old female who presents for evaluation of diverticulosis, gastroesophageal reflux disease, history of colon polyp      REVIEW OF SYSTEMS: Per the HPI, and otherwise unremarkable.    Historical Information   Past Medical History:   Diagnosis Date    Arthritis 3/27/2024    Asthma 3/27/2024    Diverticulitis of colon 3/27/2024    Diverticulosis     Hypertension 3/27/2024     Past Surgical History:   Procedure Laterality Date    COLONOSCOPY  03/2017     Social History   Social History     Substance and Sexual Activity   Alcohol Use Yes    Comment: occ     Social History     Substance and Sexual Activity   Drug Use No     Social History     Tobacco Use   Smoking Status Never   Smokeless Tobacco Never     Family History   Problem Relation Age of Onset    No Known Problems Mother     Arthritis Father        Meds/Allergies     (Not in a hospital admission)      Allergies   Allergen Reactions    Morphine Shortness Of Breath     duramorph    Naratriptan Shortness Of Breath    Iv Dye [Iodinated Contrast Media] Chest Pain    Antihistamines, Chlorpheniramine-Type Other (See Comments)    Cetirizine      Other reaction(s): breathing difficulty, anxiety, insomnia    Iodine - Food Allergy Hypertension    Penicillins      Other reaction(s): GI upset    Latex Rash    Other Rash       Objective     Blood pressure 107/62, pulse 67, temperature 98 °F (36.7 °C), temperature source Tympanic, resp. rate 16, height 5' 6\" (1.676 m), weight 77 kg (169 lb 12.1 oz), SpO2 99%.      PHYSICAL EXAM    Gen: NAD  CV: RRR  CHEST: Clear  ABD: soft, NT/ND  EXT: no edema      ASSESSMENT/PLAN:  This is a 63 y.o. year old female here for EGD, colonoscopy, and she is stable and optimized for her procedure.          "

## 2024-03-27 NOTE — ANESTHESIA POSTPROCEDURE EVALUATION
Post-Op Assessment Note    CV Status:  Stable    Pain management: adequate       Mental Status:  Alert and awake   Hydration Status:  Euvolemic   PONV Controlled:  Controlled   Airway Patency:  Patent     Post Op Vitals Reviewed: Yes    No anethesia notable event occurred.    Staff: CRNA               BP (!) 82/46 (03/27/24 1355)    Temp 97.9 °F (36.6 °C) (03/27/24 1355)    Pulse 66 (03/27/24 1355)   Resp 16 (03/27/24 1355)    SpO2 100 % (03/27/24 1355)

## 2024-03-27 NOTE — ANESTHESIA PREPROCEDURE EVALUATION
"Procedure:  COLONOSCOPY  EGD    Relevant Problems   CARDIO   (+) Hypertension      GI/HEPATIC   (+) GERD (gastroesophageal reflux disease)      MUSCULOSKELETAL   (+) Arthritis   (+) Fibromyalgia      NEURO/PSYCH   (+) Fibromyalgia      PULMONARY   (+) Asthma      FEN/Gastrointestinal   (+) Diverticulitis of colon             Anesthesia Plan  ASA Score- 2     Anesthesia Type- IV sedation with anesthesia with ASA Monitors.         Additional Monitors:     Airway Plan:            Plan Factors-Exercise tolerance (METS): >4 METS.    Chart reviewed.   Existing labs reviewed. Patient summary reviewed.    Patient is not a current smoker.              Induction- intravenous.    Postoperative Plan-     Informed Consent- Anesthetic plan and risks discussed with patient.  I personally reviewed this patient with the CRNA. Discussed and agreed on the Anesthesia Plan with the CRNA..          VITALS  There were no vitals taken for this visit.  BP Readings from Last 3 Encounters:   12/11/23 105/71   01/19/21 98/72   01/29/20 92/64     LABS  Results from Last 12 Months   Lab Units 02/21/24  0737   SODIUM mmol/L 142   POTASSIUM mmol/L 4.3   CHLORIDE mmol/L 103   CO2 mmol/L 31   ANION GAP  8   BUN mg/dL 30*   CREATININE mg/dL 0.86   CALCIUM mg/dL 9.8   GLUCOSE RANDOM mg/dL 96   AST U/L 17   ALT U/L 11   ALK PHOS U/L 67   TOTAL BILIRUBIN mg/dL 0.6   ALBUMIN g/dL 4.1     No results for input(s): \"WBC\", \"HGB\", \"HCT\", \"PLT\" in the last 8784 hours.  No results for input(s): \"APTT\", \"INR\", \"PTT\" in the last 8784 hours.    ECG  n/a  No results found for this or any previous visit (from the past 4464 hour(s)).  No results found for this or any previous visit.    ECHOCARDIOGRAPHY AND OTHER TESTING/IMAGING  2016 TTE  SUMMARY     LEFT VENTRICLE:  Systolic function was normal. Ejection fraction was estimated to be 60 %.  There were no regional wall motion abnormalities.  Wall thickness was normal.     MITRAL VALVE:  There was trace " regurgitation.     AORTIC VALVE:  There was trace regurgitation.     TRICUSPID VALVE:  There was trace regurgitation.  Pulmonary artery systolic pressure was within the normal range.     HISTORY: PRIOR HISTORY: Significant family history positive for Hypertrophic  Cardiomyopathy; Patient has no history of cardiovascular disease.    ANESTHESIA RISK-BENEFIT DISCUSSION  BENEFITS INCLUDE (NBK [id number], PMID 75867391):   (1) A specialized anesthesia team reduces mortality and morbidity for major surgeries.  (2) The team provides analgesia/sedation/amnesia/akinesia as safely as possible.  (3) The team strives to reduce discomfort as much as reasonably possible.    RISKS ASSESSMENT (AND PLANS TO MITIGATE RISKS) INCLUDE:      Neurologic system: IntraOp awareness (Risk is ~1:1,000 - 1:14,000; PMID 25522172), Stroke (Risk ~<0.1-2% for most cases; PMID 15201539), nerve injury, vision loss, and POCD.     Airway and Pulmonary system: Dental or mouth injury, throat pain, critical hypoxia, pneumothorax, prolonged intubation, post-op respiratory compromise.  Airway/Intubation risks and prior data: No prior advanced airway notes in [facility] EMR  Major ARISCAT risk factors for pulmonary complications include: none, yielding a score 0-1= Low risk, 1.6%.  Cardiovascular system: Hypotension/Vasoplegia, arrhythmias, blood clot, lisset-op cardiac injury/MACE, bleeding, infection, or injury to vascular structures.  Signs of active, severe cardiac instability: none  Tan's RCRI score items: none, yielding an RCRI Score of 0= 0.4% risk of MACE  Are lisset-op or intra-op beta blockers indicated? (PMID 04317708): no  FEN/GI system: Aspiration risk (~0.5% [facility] [id number]) and PONV (10-80% per Apfel score).  ASA NPO guideline compliance?: Yes  Medication risk assessment: Allergic reactions, bleeding due to anticoagulant use, overdoses, drug-drug interactions, injury to a fetus or  in pregnant or breastfeeding patients, sedation while driving/operating  heavy machinery.  Recent relevant medications: See MAR or Med Review  Personal or family history of anesthesia complications: yes: delayed emergence  Pregnancy Status: N/A  Estimate mortality risks associated with anesthesia based on ASA-PS (PMID 36462484): ASA-PS II: risk 1:20,000

## 2024-03-30 PROCEDURE — 88305 TISSUE EXAM BY PATHOLOGIST: CPT | Performed by: PATHOLOGY

## 2024-05-29 ENCOUNTER — NURSE TRIAGE (OUTPATIENT)
Age: 64
End: 2024-05-29

## 2024-05-29 ENCOUNTER — TELEPHONE (OUTPATIENT)
Age: 64
End: 2024-05-29

## 2024-05-29 NOTE — TELEPHONE ENCOUNTER
Patient had sent a [employer] message stating that she feels she is having a diverticulitis flare up.  I called patient and left a message for patient to call back so that a nurse can triage her symptoms.

## 2024-05-29 NOTE — TELEPHONE ENCOUNTER
"Pt c/o of LLQ discomfort 3/10, started a couple days ago, feeling a bit better today. Unsure if she has fever.Does get better with bowel movement and dietary changes she made. Advised 24-48 hours clear liquid diet then increase to bland/low fiber then  to normal. Asked to call on Friday morning with an update and if worse before then to call back.      Last ov 12/11/23. Colon/EGD 3/27/24.    IMPRESSION:  Extensive diverticulosis of severe severity in the proximal ascending colon, mid ascending colon, distal ascending colon, proximal transverse colon, mid transverse colon, distal transverse colon, proximal sigmoid colon, mid sigmoid colon and distal sigmoid colon    Reason for Disposition   Mild abdominal pain    Answer Assessment - Initial Assessment Questions  1. LOCATION: \"Where does it hurt?\"       LLQ  2. RADIATION: \"Does the pain shoot anywhere else?\" (e.g., chest, back)      Around left side of left back  3. ONSET: \"When did the pain begin?\" (e.g., minutes, hours or days ago)       Couple days ago  4. SUDDEN: \"Gradual or sudden onset?\"      gradual  5. PATTERN \"Does the pain come and go, or is it constant?\"     - If constant: \"Is it getting better, staying the same, or worsening?\"       (Note: Constant means the pain never goes away completely; most serious pain is constant and it progresses)      - If intermittent: \"How long does it last?\" \"Do you have pain now?\"      (Note: Intermittent means the pain goes away completely between bouts)      constant  6. SEVERITY: \"How bad is the pain?\"  (e.g., Scale 1-10; mild, moderate, or severe)    - MILD (1-3): doesn't interfere with normal activities, abdomen soft and not tender to touch     - MODERATE (4-7): interferes with normal activities or awakens from sleep, tender to touch     - SEVERE (8-10): excruciating pain, doubled over, unable to do any normal activities       2/10  7. RECURRENT SYMPTOM: \"Have you ever had this type of stomach pain before?\" If Yes, ask: " "\"When was the last time?\" and \"What happened that time?\"       Years ago  8. CAUSE: \"What do you think is causing the stomach pain?\"      Thinks diverticulitis  9. RELIEVING/AGGRAVATING FACTORS: \"What makes it better or worse?\" (e.g., movement, antacids, bowel movement)      Bowel movement/ dietary change  10. OTHER SYMPTOMS: \"Has there been any vomiting, diarrhea, constipation, or urine problems?\"        denies    Protocols used: Abdominal Pain - Female-ADULT-[place]    "

## 2024-05-31 NOTE — TELEPHONE ENCOUNTER
"Pt providing update. Improvement noted on liquid diet. Now on low fiber diet. Has \"some\" LLQ discomfort; \"I can't call it pain\". Denies fever, n/v.     Pt has concerns for potential constipation due to less fiber. Advised hydration and stool softener or Miralax. Pt verbalized understanding.         "

## 2024-08-13 DIAGNOSIS — K21.9 GASTROESOPHAGEAL REFLUX DISEASE WITHOUT ESOPHAGITIS: ICD-10-CM

## 2024-08-13 RX ORDER — RABEPRAZOLE SODIUM 20 MG/1
20 TABLET, DELAYED RELEASE ORAL DAILY
Qty: 30 TABLET | Refills: 5 | Status: SHIPPED | OUTPATIENT
Start: 2024-08-13

## 2024-12-13 DIAGNOSIS — K21.9 GASTROESOPHAGEAL REFLUX DISEASE WITHOUT ESOPHAGITIS: ICD-10-CM

## 2024-12-13 RX ORDER — RABEPRAZOLE SODIUM 20 MG/1
20 TABLET, DELAYED RELEASE ORAL DAILY
Qty: 30 TABLET | Refills: 3 | Status: SHIPPED | OUTPATIENT
Start: 2024-12-13

## 2025-03-28 ENCOUNTER — OFFICE VISIT (OUTPATIENT)
Dept: GASTROENTEROLOGY | Facility: CLINIC | Age: 65
End: 2025-03-28
Payer: COMMERCIAL

## 2025-03-28 ENCOUNTER — HOSPITAL ENCOUNTER (OUTPATIENT)
Dept: RADIOLOGY | Facility: MEDICAL CENTER | Age: 65
Discharge: HOME | End: 2025-03-28
Payer: COMMERCIAL

## 2025-03-28 VITALS
SYSTOLIC BLOOD PRESSURE: 126 MMHG | WEIGHT: 173.4 LBS | HEIGHT: 66 IN | BODY MASS INDEX: 27.87 KG/M2 | HEART RATE: 73 BPM | DIASTOLIC BLOOD PRESSURE: 72 MMHG | RESPIRATION RATE: 18 BRPM | OXYGEN SATURATION: 98 % | TEMPERATURE: 97.8 F

## 2025-03-28 DIAGNOSIS — R10.32 LEFT LOWER QUADRANT ABDOMINAL PAIN: ICD-10-CM

## 2025-03-28 DIAGNOSIS — K57.90 DIVERTICULOSIS: ICD-10-CM

## 2025-03-28 DIAGNOSIS — K57.90 DIVERTICULOSIS: Primary | ICD-10-CM

## 2025-03-28 PROCEDURE — 99213 OFFICE O/P EST LOW 20 MIN: CPT | Performed by: PHYSICIAN ASSISTANT

## 2025-03-28 PROCEDURE — 74176 CT ABD & PELVIS W/O CONTRAST: CPT

## 2025-03-28 RX ORDER — DICYCLOMINE HCL 20 MG
20 TABLET ORAL EVERY 6 HOURS PRN
Qty: 30 TABLET | Refills: 0 | Status: SHIPPED | OUTPATIENT
Start: 2025-03-28

## 2025-03-28 RX ORDER — METRONIDAZOLE 500 MG/1
500 TABLET ORAL EVERY 8 HOURS SCHEDULED
Qty: 30 TABLET | Refills: 0 | Status: SHIPPED | OUTPATIENT
Start: 2025-03-28 | End: 2025-04-07

## 2025-03-28 RX ORDER — CIPROFLOXACIN 500 MG/1
500 TABLET, FILM COATED ORAL EVERY 12 HOURS SCHEDULED
Qty: 20 TABLET | Refills: 0 | Status: SHIPPED | OUTPATIENT
Start: 2025-03-28 | End: 2025-04-07

## 2025-03-28 NOTE — PROGRESS NOTES
Name: Kristina Napoles      : 1960      MRN: [id number]  Encounter Provider: Ursula Yang PA-C  Encounter Date: 3/28/2025   Encounter department: [facility] [place]  :  Assessment & Plan  Diverticulosis    Left lower quadrant abdominal pain  She reports worsening left lower quadrant pain over the past 1 week  Preempted by taking erythromycin leading to severe diarrhea  For diarrhea she took Imodium which caused severe constipation leading to her taking Colace again with diarrhea    History of diverticulitis last diagnosed and treated in     Last colonoscopy in  with diverticulosis but otherwise normal    She is tender on exam with rebound tenderness    Will plan STAT CT AP     I called in antibiotics but asked her to hold until after imaging    She will limit fiber now and increase after acute episode is resolved    If CT is negative and continuing diarrhea she will need stool testing to rule out C. difficile        History of Present Illness   HPI  Kristina Napoles is a 64 y.o. female with a history of diverticulitis who presents for evaluation of 1 week of left lower quadrant abdominal pain.  Patient reports she started to have gastrointestinal distress about 2 weeks ago.  She had a Mohs surgery was prescribed erythromycin afterwards.  She took this for the prescribed 7 days but reports she was having severe watery diarrhea and abdominal discomfort with this.  Because of the diarrhea she took Imodium.  1 Imodium pill caused her to be severely constipated.  Because of that she took Colace which caused severe watery diarrhea.  Since then she has even been having no stools or diarrhea.  She has no rectal bleeding.  She has no nausea or vomiting.  She denies any fevers or chills.  She has a history of recurrent diverticulitis.  Her last episode was in  which was treated with Cipro and Flagyl.  Her last colonoscopy was in May 2024 with extensive  diverticulosis.  She reports that over the past few years she has actually been doing quite well.    After the new year the patient decided to pursue a healthy diet and completely cut back on carbohydrates.  Because of this she has not been taking in much fiber.        Review of Systems  Medical History Reviewed by provider this encounter:     .  Past Medical History   Past Medical History:   Diagnosis Date    Arthritis 3/27/2024    Asthma 3/27/2024    Diverticulitis of colon 3/27/2024    Diverticulosis     Hypertension 3/27/2024     Past Surgical History:   Procedure Laterality Date    COLONOSCOPY  03/2017     Family History   Problem Relation Age of Onset    No Known Problems Mother     Arthritis Father       reports that she has never smoked. She has never used smokeless tobacco. She reports current alcohol use. She reports that she does not use drugs.  Current Outpatient Medications   Medication Instructions    ciprofloxacin (CIPRO) 500 mg, Oral, Every 12 hours scheduled    cycloSPORINE (RESTASIS) 0.05 % ophthalmic emulsion INSTILL 1 DROP INTO BOTH EYES TWICE A DAY    metroNIDAZOLE (FLAGYL) 500 mg, Oral, Every 8 hours scheduled    olmesartan (BENICAR) 20 mg, Daily    RABEprazole (ACIPHEX) 20 mg, Oral, Daily     Allergies   Allergen Reactions    Morphine Shortness Of Breath     duramorph    Naratriptan Shortness Of Breath    Iv Dye [Iodinated Contrast Media] Chest Pain    Antihistamines, Chlorpheniramine-Type Other (See Comments)    Cetirizine      Other reaction(s): breathing difficulty, anxiety, insomnia    Iodine - Food Allergy Hypertension    Penicillins      Other reaction(s): GI upset    Latex Rash    Other Rash      Current Outpatient Medications on File Prior to Visit   Medication Sig Dispense Refill    cycloSPORINE (RESTASIS) 0.05 % ophthalmic emulsion INSTILL 1 DROP INTO BOTH EYES TWICE A DAY      olmesartan (BENICAR) 40 mg tablet Take 20 mg by mouth daily      RABEprazole (ACIPHEX) 20 MG tablet TAKE 1  "TABLET BY MOUTH EVERY DAY 30 tablet 3    [DISCONTINUED] azilsartan medoxomil (EDARBI) 80 MG tablet TAKE ONE TABLET BY MOUTH DAILY      [DISCONTINUED] betamethasone valerate (VALISONE) 0.1 % cream       [DISCONTINUED] ciclopirox (LOPROX) 0.77 % cream APPLY TO FINGERS TWICE DAILY FOR 6 MONTHS       No current facility-administered medications on file prior to visit.      Social History     Tobacco Use    Smoking status: Never    Smokeless tobacco: Never   Vaping Use    Vaping status: Never Used   Substance and Sexual Activity    Alcohol use: Yes     Comment: occ    Drug use: No    Sexual activity: Not on file        Objective   /72 (BP Location: Left arm, Patient Position: Sitting, Cuff Size: Standard)   Pulse 73   Temp 97.8 °F (36.6 °C)   Resp 18   Ht 5' 6\" (1.676 m)   Wt 78.7 kg (173 lb 6.4 oz)   SpO2 98%   BMI 27.99 kg/m²      Physical Exam  Vitals reviewed.   Constitutional:       Appearance: Normal appearance.   HENT:      Head: Normocephalic and atraumatic.   Eyes:      Extraocular Movements: Extraocular movements intact.      Pupils: Pupils are equal, round, and reactive to light.   Cardiovascular:      Rate and Rhythm: Normal rate and regular rhythm.   Abdominal:      General: Bowel sounds are normal. There is no distension.      Palpations: Abdomen is soft. There is no mass.      Tenderness: There is abdominal tenderness in the left lower quadrant. There is guarding and rebound.   Skin:     General: Skin is warm and dry.      Coloration: Skin is not jaundiced.   Neurological:      General: No focal deficit present.      Mental Status: She is alert and oriented to person, place, and time.           "

## 2025-03-29 ENCOUNTER — NURSE TRIAGE (OUTPATIENT)
Dept: OTHER | Facility: OTHER | Age: 65
End: 2025-03-29

## 2025-03-29 NOTE — TELEPHONE ENCOUNTER
"Regarding: stat CT / start med?  ----- Message from Wanda VIDAL sent at 3/29/2025  3:33 PM EDT -----  \"I got a stat CT because they think I have diverticulitis, they said they would call either last night or this morning to tell me if I should start the antibiotics they had me  or not\"    "

## 2025-03-29 NOTE — TELEPHONE ENCOUNTER
"FOLLOW UP: Please contact patient with CT results    REASON FOR CONVERSATION: Results    SYMPTOMS: left lower quadrant abdominal pain    OTHER: had STAT CT abdomen scan yesterday and waiting to see if she should start antibiotic based on the results.     DISPOSITION: Home Care (Information or Advice Only Call). Advised patient the CT has not been read yet. She verbalized understanding.         Reason for Disposition   Caller has medicine question only, adult not sick, AND triager answers question    Answer Assessment - Initial Assessment Questions  1. NAME of MEDICINE: \"What medicine(s) are you calling about?\"        Ciprofloxacin    2. QUESTION: \"What is your question?\" (e.g., double dose of medicine, side effect)        Patient would like to know if she should start antibiotics based on CT scan     3. PRESCRIBER: \"Who prescribed the medicine?\" Reason: if prescribed by specialist, call should be referred to that group.        Ursula GOMEZ (GI)    4. SYMPTOMS: \"Do you have any symptoms?\" If Yes, ask: \"What symptoms are you having?\"  \"How bad are the symptoms (e.g., mild, moderate, severe)        Left lower quadrant abdominal pain    Protocols used: Medication Question Call-Adult-[facility]    "

## 2025-03-31 ENCOUNTER — TELEPHONE (OUTPATIENT)
Dept: GASTROENTEROLOGY | Facility: AMBULARY SURGERY CENTER | Age: 65
End: 2025-03-31

## 2025-03-31 NOTE — TELEPHONE ENCOUNTER
Called & spoke to patient. Gave patient test results as per Ursula. Patient asked if she needs to have a follow up visit? Will route a message to PA

## 2025-03-31 NOTE — TELEPHONE ENCOUNTER
We received a call from Regine in radiology to inform Ursula of immediate findings on the cat scan abdomen / pelvis done on 3/28/25

## 2025-03-31 NOTE — TELEPHONE ENCOUNTER
Routing to GI provider pool      Patient asking for US results.....        Please advise thank you !

## 2025-03-31 NOTE — TELEPHONE ENCOUNTER
Called & spoke to patient. Gave patient message as per Ursula. Patient voiced understanding and had no further questions or concerns

## 2025-04-20 DIAGNOSIS — K21.9 GASTROESOPHAGEAL REFLUX DISEASE WITHOUT ESOPHAGITIS: ICD-10-CM

## 2025-04-21 RX ORDER — RABEPRAZOLE SODIUM 20 MG/1
20 TABLET, DELAYED RELEASE ORAL DAILY
Qty: 30 TABLET | Refills: 3 | Status: SHIPPED | OUTPATIENT
Start: 2025-04-21